# Patient Record
Sex: FEMALE | Race: WHITE
[De-identification: names, ages, dates, MRNs, and addresses within clinical notes are randomized per-mention and may not be internally consistent; named-entity substitution may affect disease eponyms.]

---

## 2017-01-14 NOTE — P.HPOB
History of Present Illness


H&P Date: 17


Chief Complaint: SROM





21 year old  presents at 38 weeks 4 days with spontaneous rupture of 

membranes at 1515. Clear fluid was noted and she was sivakumar irregularly. 

FEtal heart tones 110-115 with moderate variability and reactive. Cervix was 3/

80/-2.





Review of Systems


All systems: negative


Constitutional: Denies chills, Denies fever


Eyes: denies blurred vision, denies pain


Ears, nose, mouth and throat: Denies headache, Denies sore throat


Cardiovascular: Denies chest pain, Denies shortness of breath


Respiratory: Denies cough


Gastrointestinal: Denies abdominal pain, Denies diarrhea, Denies nausea, Denies 

vomiting


Genitourinary: Denies dysuria, Denies hematuria


Musculoskeletal: Denies myalgias


Integumentary: Denies pruritus, Denies rash


Neurological: Denies numbness, Denies weakness


Psychiatric: Denies anxiety, Denies depression


Endocrine: Denies fatigue, Denies weight change





Past Medical History


Past Medical History: No Reported History


Additional Past Medical History / Comment(s): OB history: This is her first 

pregnancy and she had prenatal care with Dr Colon since 11 weeks.  A neg, abs neg

, Rub low immunity, HEp B neg, HIV NR, GBS neg. Abnormal 1hr, normal 3 hr GTT.


History of Any Multi-Drug Resistant Organisms: None Reported


Past Surgical History: No Surgical Hx Reported


Past Anesthesia/Blood Transfusion Reactions: No Reported Reaction


Past Psychological History: Anxiety


Smoking Status: Never smoker


Past Alcohol Use History: None Reported


Past Drug Use History: None Reported





- Past Family History


  ** Mother


Family Medical History: CVA/TIA, Diabetes Mellitus, Thyroid Disorder





Medications and Allergies


 Home Medications











 Medication  Instructions  Recorded  Confirmed  Type


 


Flinstone Vitamins 2 tab PO DAILY 08/15/16 01/13/17 History











 Allergies











Allergy/AdvReac Type Severity Reaction Status Date / Time


 


No Known Allergies Allergy   Verified 16 15:34














Exam


Osteopathic Statement: *.  No significant issues noted on an osteopathic 

structural exam other than those noted in the History and Physical/Consult.





- Vital Signs


Vital signs: 


 Vital Signs











  Temp Pulse Resp BP Pulse Ox


 


 17 15:58  96.8 F L  99  18  137/95  98








 Intake and Output











 17





 14:59 22:59 06:59


 


Other:   


 


  Weight  66.224 kg 








 Patient Weight











 17





 06:59


 


Weight 66.224 kg














Heart: RRR


Lungs: CTAB


Abdomen: soft, nontender


Extremeties: neg liberty's





Results


Result Diagrams: 


 17 16:25





 Abnormal Lab Results - Last 24 Hours (Table)











  17 Range/Units





  16:25 


 


RBC  3.71 L  (3.80-5.40)  m/uL


 


Hgb  9.1 L  (11.4-16.0)  gm/dL


 


Hct  29.5 L  (34.0-46.0)  %


 


MCV  79.4 L  (80.0-100.0)  fL


 


MCH  24.6 L  (25.0-35.0)  pg














Assessment and Plan


(1) Spontaneous rupture of membranes


Status: Acute   


Plan: 





1. admit to [facility]


2. pitocin augmentation


3. anticipate normal vaginal delivery.

## 2017-01-14 NOTE — P.PROBDLV
Vaginal Delivery Note





- .


Vaginal Delivery Note: 





21-year-old  presented at 38 weeks and 4 days with spontaneous rupture 

membranes at 1515.  Clear fluid was noted and amniotic sure was positive.  

Cervix was dilated 3 cm, 80% effaced, -2 station.  She is sivakumar 

irregularly.  Fetal heart tones 110-115 with moderate variability and reactive.

  Pitocin augmentation was started.  When she was about 4 cm she did get an 

epidural and was comfortable.  Her cervix was completely dilated around 1:30 in 

the morning on 17.  She pushed, and delivered a viable male infant over an 

episiotomy under epidural anesthesia.  Head deliveredop, nuchal cord 1 easily 

reduced.  Anterior shoulder delivered gentle downward traction followed by 

posterior shoulder and rest of body.  Nose and mouth bulb suctioned, cord 

clamped and cut, infant placed mother's abdomen.  Apgars 8, 9, weight 7 lbs. 13 

oz.  Placenta delivered spontaneously, intact with three-vessel cord at 4:18 

AM.  Vagina, cervix, and perineum were inspected.  Second-degree midline 

laceration was repaired with 20, and 3-0 Vicryl.  Patient continued to have 

some bleeding so bladder was emptied.  Despite the Pitocin in the IV she was 

still having some bleeding so methargen was given IM.  The bleeding slowed 

down.   mL.  Mother and baby in stable condition.

## 2017-01-15 NOTE — P.DS
Providers


Date of admission: 


01/13/17 15:51





Expected date of discharge: 01/15/17


Attending physician: 


Sara Colon





Primary care physician: 


Stated None








- Discharge Diagnosis(es)


(1) Spontaneous rupture of membranes


Current Visit: Yes   Status: Resolved   





(2) Normal vaginal delivery


Current Visit: Yes   Status: Acute   


Hospital Course: 





Patient presented with spontaneous rupture membranes.  She underwent normal 

vaginal delivery of Pitocin augmentation.  She'll be discharged home postpartum 

day #1 in stable condition to follow-up with Dr. Colon and 6 weeks.





Plan - Discharge Summary


New Discharge Prescriptions: 


Acetaminophen-Codeine 300-30mg [Tylenol #3] 2 tab PO Q6H PRN #30 tablet


 PRN Reason: Pain


Docusate [Colace] 100 mg PO BID #60 capsule


Ibuprofen [Motrin] 600 mg PO Q6HR PRN #30 tab


 PRN Reason: Mild Pain Or Fever >= 100.5


Discharge Medication List





Flinstone Vitamins 2 tab PO DAILY 08/15/16 [History]


Acetaminophen-Codeine 300-30mg [Tylenol #3] 2 tab PO Q6H PRN #30 tablet 01/15/

17 [Rx]


Docusate [Colace] 100 mg PO BID #60 capsule 01/15/17 [Rx]


Ibuprofen [Motrin] 600 mg PO Q6HR PRN #30 tab 01/15/17 [Rx]








Follow up Appointment(s)/Referral(s): 


Sara Colon DO [Doctor of Osteopathic Medicine] - 6 Weeks


Discharge Disposition: HOME SELF-CARE

## 2022-12-15 ENCOUNTER — HOSPITAL ENCOUNTER (OUTPATIENT)
Dept: HOSPITAL 47 - RADUSWWP | Age: 27
Discharge: HOME | End: 2022-12-15
Attending: OBSTETRICS & GYNECOLOGY
Payer: COMMERCIAL

## 2022-12-15 DIAGNOSIS — Z36.89: Primary | ICD-10-CM

## 2022-12-15 DIAGNOSIS — Z3A.01: ICD-10-CM

## 2022-12-15 PROCEDURE — 76801 OB US < 14 WKS SINGLE FETUS: CPT

## 2022-12-15 PROCEDURE — 76817 TRANSVAGINAL US OBSTETRIC: CPT

## 2022-12-15 NOTE — US
EXAMINATION TYPE: Transabdominal

 

DATE OF EXAM: 12/15/2022 3:54 PM

 

COMPARISON: NONE

 

CLINICAL HISTORY: Z36.89 ENCOUNTER FOR OTHER SPECIFIED  SCR. Viability. .

 

EXAM PERFORMED:  Transvaginal (TV) and Transabdominal (TA)

 

EXAM MEASUREMENTS:

 

GESTATIONAL AGE / DATING

 

Physician Established: Not yet established. 

Dates by LMP: (9 weeks/0 days)  

** EDC: 2023

Dates by First Scan:  This is first scan 

Dates by Current Scan for: (7 weeks/6 days)  

** EDC: 2023 by CRL. No heart tones seen.

Gestational sac measures 7 weeks 0 days.

 

MATERNAL ANATOMY 

 

Uterus: 13.1 x 7.1 x 6.0 cm.

Right Ovary: 3.4 x 1.9 x 1.4 cm.

Left Ovary: 7.3 x 5.8 x 5.1 cm.**Appears enlarged. Septated anechoic area seen: 6.2 x 4.3 x 4.4 cm.

Post CDS / Adnexa: Prominent vessels seen within bilateral adnexa. Free fluid seen in CDS.

Presence of free fluid: Free fluid seen in Cul de sac.

Presence of corpus luteal cyst:? Septated anechoic area seen within left ovary as mentioned above.

Presence of subchorionic bleed: No

 

GESTATION / FETAL SURVEY

 

CRL: 1.49 cm (7 weeks/6 days)

MSD: 2.34 cm (7 weeks/0 days)

Yolk Sac (normal less than 6mm): 4.4 mm

Heart Rate: Not  seen 

Rhythm:  --

IUP:  CRL seen, no fetal heart tones seen

 

Date of LMP: 10/13/2022

Beta HcG (if available):  Not available

 

 

 

 

 

IMPRESSION:

 

1. Gestational age is 7 weeks 6 days based on crown-rump length and 7 weeks 0 days based on mean sac 
diameter. Cardiac activity is not identified. Dating is 2 weeks behind dating by last menstrual perio
d of 9 weeks 0 days. Correlate with beta hCG. Intrauterine fetal demise suspected.